# Patient Record
Sex: MALE | RURAL
[De-identification: names, ages, dates, MRNs, and addresses within clinical notes are randomized per-mention and may not be internally consistent; named-entity substitution may affect disease eponyms.]

---

## 2019-03-06 ENCOUNTER — HISTORICAL (OUTPATIENT)
Dept: ADMINISTRATIVE | Facility: HOSPITAL | Age: 54
End: 2019-03-06

## 2019-03-08 LAB
LAB AP CLINICAL INFORMATION: NORMAL
LAB AP COMMENTS: NORMAL
LAB AP DIAGNOSIS - HISTORICAL: NORMAL
LAB AP GROSS PATHOLOGY - HISTORICAL: NORMAL
LAB AP SPECIMEN SUBMITTED - HISTORICAL: NORMAL

## 2019-03-21 ENCOUNTER — HISTORICAL (OUTPATIENT)
Dept: ADMINISTRATIVE | Facility: HOSPITAL | Age: 54
End: 2019-03-21

## 2024-04-16 RX ORDER — EZETIMIBE AND SIMVASTATIN 10; 10 MG/1; MG/1
1 TABLET ORAL NIGHTLY
COMMUNITY

## 2024-04-16 RX ORDER — CARVEDILOL 12.5 MG/1
12.5 TABLET ORAL 2 TIMES DAILY WITH MEALS
COMMUNITY

## 2024-04-16 RX ORDER — PANTOPRAZOLE SODIUM 40 MG/1
40 TABLET, DELAYED RELEASE ORAL DAILY
COMMUNITY

## 2024-04-16 RX ORDER — ATORVASTATIN CALCIUM 80 MG/1
80 TABLET, FILM COATED ORAL DAILY
COMMUNITY

## 2024-04-16 RX ORDER — LOSARTAN POTASSIUM 50 MG/1
50 TABLET ORAL DAILY
COMMUNITY

## 2024-04-17 ENCOUNTER — HOSPITAL ENCOUNTER (OUTPATIENT)
Facility: HOSPITAL | Age: 59
Discharge: HOME | End: 2024-04-17
Attending: ANESTHESIOLOGY | Admitting: ANESTHESIOLOGY
Payer: COMMERCIAL

## 2024-04-17 VITALS
WEIGHT: 211.63 LBS | HEIGHT: 71 IN | HEART RATE: 51 BPM | RESPIRATION RATE: 11 BRPM | TEMPERATURE: 98 F | SYSTOLIC BLOOD PRESSURE: 109 MMHG | DIASTOLIC BLOOD PRESSURE: 72 MMHG | OXYGEN SATURATION: 100 % | BODY MASS INDEX: 29.63 KG/M2

## 2024-04-17 DIAGNOSIS — M25.552 LEFT HIP PAIN: ICD-10-CM

## 2024-04-17 PROCEDURE — 63600175 PHARM REV CODE 636 W HCPCS: Mod: JG | Performed by: ANESTHESIOLOGY

## 2024-04-17 PROCEDURE — 20610 DRAIN/INJ JOINT/BURSA W/O US: CPT | Mod: LT | Performed by: ANESTHESIOLOGY

## 2024-04-17 RX ORDER — TRIAMCINOLONE ACETONIDE 40 MG/ML
INJECTION, SUSPENSION INTRA-ARTICULAR; INTRAMUSCULAR CODE/TRAUMA/SEDATION MEDICATION
Status: DISCONTINUED | OUTPATIENT
Start: 2024-04-17 | End: 2024-04-17 | Stop reason: HOSPADM

## 2024-04-17 RX ORDER — SODIUM CHLORIDE 9 MG/ML
500 INJECTION, SOLUTION INTRAVENOUS CONTINUOUS
Status: DISCONTINUED | OUTPATIENT
Start: 2024-04-17 | End: 2024-04-17 | Stop reason: HOSPADM

## 2024-04-17 RX ORDER — BUPIVACAINE HYDROCHLORIDE 2.5 MG/ML
INJECTION, SOLUTION INFILTRATION; PERINEURAL CODE/TRAUMA/SEDATION MEDICATION
Status: DISCONTINUED | OUTPATIENT
Start: 2024-04-17 | End: 2024-04-17 | Stop reason: HOSPADM

## 2024-04-17 NOTE — H&P
[facility] - Pain Management  Pain Management  H&P    Patient Name: Felix Yi  MRN: [id number]  Admission Date: 2024  Primary Care Provider: Rabia Hope NP    Patient information was obtained from      Subjective:     Principal Problem:SWETHA LEE MD,P.A.  [address] [place] [id number]                 RE: Felix Yi   : 1965Date of Service: 3/1/2024  New Patient Referred by Rabia Hope        Chief Complaint:   Hip pain right, left; characterized as sharp pain Heat.   New patient seated in exam room 6 with c/o bilateral hip pain.  Controlled Substance Prescription Agreement signed and reviewed. Verbalizes understanding. 3-1-24  [place] Prescription Monitoring Program data was reviewed for this patient for the past 12 calendar months to ascertain any current,or past use of scheduled medications.                                                                                    Opioid Risk Tool                                                                                 Johnson each box                           Item Score                        Item Score                                                                              that applies.                               if Female.                          if Male                    1. Family history of substance abuse                  Alcohol  (  )                                      1                                     3                                                                              Illegal Drugs (  )                               2                                     3                                                                              Prescription Drugs (  )                     4                                     4        2. Personal History of substance abuse          Alcohol  (  )                                      3                                      3                                                                              Illegal Drugs (  )                               4                                     4                                                                             Prescription Drugs (  )                     5                                      5     3. Age (Johnson box if 16-45)                                           (  )                                          1                                      1        4. History of Preadolescent Sexual Abuse                   (  )                                         3                                      0        5. Psychological Disease    Attention Deficit disorder  (  )                                         2                                       2                                                             or                                              Obsessive Compulsive                                                           disorder                                                               or                                                                                       Bipolar Schizophrenia                                                              Depression                        (  )                                         1                                        1        Total=0     Total Score Risk Category           Low risk 0-3         Moderate risk 4-7              High risk >8           History of Present Illness:   What part of the body? bilateral hip   Pain level at best 3; Pain level at worst 10; Pain level at present 3   History of present illness  Felix Yi  is a  59y/o male who presents for evaluation and management of chronic pain in his lower back and bilateral hip pain.   Subjective: Patient presents today for evaluation for pain problems. Pt reports that pain started years ago. He reports that he has had Im injection of steroids, medrol dose pack, and some NSAIDS  by mouth that has not helped with his pain. He states when the pain occurs it is severe. He is unable to moveuch at all r/t the pain and has fallen because of the pain. We have discussed with the pt that he can do nothing and give time, he can try NSAIDS, he can do Hip IA injections x 3 with max of 3 in a year, he can do SI joint injections x2 to lead up to RFTC, He can do GTBs bilateral,  he can consider obturator/femoral articular NB,he can  and can consider SI fusion.  After review of records, pts physical exam and discussion with the patient we will plan to set him up for a left hip IA injection without sedation. We will also send him in a rx for Celebrex 200. Left hip IA injection without sedation DX:M25.552) - Left hip pain      A presumptive urine drug screen was done today to rapidly obtain and integrate results into clinical assessment and decision-making for ongoing safe prescribing of controlled substances   Allergies:   No Known Allergies Confirmed - 03/01/24 10:49:46 AM CST    Current Medications:   Medications List Reviewed (03/01/24 10:52:48 AM CST)  Atorvastatin Calcium Oral Tablet 80 MG (3/1/2024)  Carvedilol Oral Tablet 12.5 MG (3/1/2024)  Ezetimibe-Simvastatin Oral Tablet 10-10 MG (3/1/2024)  Pantoprazole Sodium Oral Packet 40 MG (3/1/2024)  Losartan Potassium Oral Tablet 50 MG (3/1/2024) Take 1 tablet once a day   Previous Studies:  X-RAY [facility] 01/24/2024-Bilateral Hip xray Impression Mild narrowing of both hip joints.   Past Medical History:   The patient has a past medical history of  Hypertension, High Cholesterol.   Surgical History:   Heart Stents   Social History:      Smoking Status: Never smoker; Last Reviewed: 03/01/2024   Tobacco User: No                     Alcohol use:  No drug use  Occupation: works full-time  Marital status: Single         Family History:   There is a family history of  Hypertension, Heart Disease.   Review of Systems:   General:  Patient denies  sweats,  fatigue, fever, chills, recent change in weight.  Eyes:  Patient denies  blurred vision, glaucoma.  Ears, Nose and Throat:  Patient denies  hearing loss, ringing in the ears, sinus congestion, difficulty swallowing.  Cardiovascular:  Patient denies  arrhythmia, chest pain, palpitations.  Respiratory:  Patient denies  requiring oxygen, shortness of breath, cough, wheezing.  Gastrointestinal:  Patient denies  ulcer, heartburn, nausea, vomiting, blood in stool, diarrhea, constipation, hemorrhoids.  Genitourinary:  Patient denies  hematuria, kidney stones, urinary frequency, polyuria, urinary hesitancy, dysuria, burning on urination, prostate problems, menstrual complications, painful intercourse.  Endocrine:  Patient denies  polydipsia, temperature intolerance, thyroid problems, loss of hair from head or body.  Hematologic:  Patient denies  bleeding tendencies, easy bruising tendency, bleeding disorders, bleeding gums.  Musculoskeletal:   Patient admits to   joint pain, joint stiffness, muscle cramps.  Patient denies  fractures, walking aids.  Neurologic  Patient denies  dizziness, seizures, headache, not confused or disoriented, memory lapses or loss, paralysis, difficulty walking.  Psychologic:  Patient denies  anxiety, panic attacks, mood disorder, emotional problems, depression, sleep disturbances, suicidal thoughts, suicide attempt(s).  Skin:  Patient denies  pruritus, jaundice, skin rash.    DEPRESSION SCREENING:   Not at all the patient reports little interest or pleasure in doing things.  Not at all the patient reports feeling down, depressed, or hopeless.  Date Depression Screening Last Done: 03/01/2024   PHQ-2 Score 0; PHQ-9 Score incomplete   Vital Signs:   Weight 216 lbs; Height 5 ft 11 in; BMI 30.1   03/01/2024 8:40 AM (CST)  Respiration Rate 18; Pulse Rate 59 bpm; Blood Pressure 132 / 79 mm/Hg; Pain Level: 3      Physical Examination:   Cranial Nerves II-XII grossly intact.  No apparent distress.  No  somnolence or slurred speech.     Lumbar spine  patient has pain with flexion and extension lateral rotation  Lumbar facets are tender to palpation  No focal neurologic deficits noted     Left hip  Pain with left hip with flexion and extension of the hip  Left SI joint  Left SI joint posteriorly rotated as compared to the right.                  Back Motion:   Lumbar / lumbosacral spine abnormal.         Additional Physical Findings:  General abnormal,   Awake, alert, oriented to time, place and person, well developed, pleasant, uncomfortable, seated  Musculoskeletal abnormal,   Hips abnormal, joint tenderness  Posture normal  Neurologic normal neurologic exam,   Cranial nerves, motor function  Gait and stance normal  Skin normal skin exam,   Dry, warm    Toxicology Report   Toxicology was performed.   Reason for Toxicology:  A presumptive urine drug screen was done today to rapidly obtain and integrate results into clinical assessment and decision-making for ongoing safe prescribing of controlled substances.   Test Date/Time: 03/01/2024 00:00   Tested By: GILMAR   Oxycodone  (OXY): Result = Negative; Control = Positive   Morphine  (OPI): Result = Negative; Control = Positive   Amphetamines  (AMP): Result = Negative; Control = Positive   Oxazepam  (BZO): Result = Negative; Control = Positive   Methadone  (MTD): Result = Negative; Control = Positive   Secobarbital  (BAR): Result = Negative; Control = Positive   Tricyclic Antidepressants  (TCA): Result = Negative; Control = Positive   Nortriptyline  (TCA): Result = Negative; Control = Positive   Marijuana-Carboxy Tetrahydrocannabinoid   (THC): Result = Positive; Control = Positive   Cocaine  (SAMARA): Result = Negative; Control = Positive   Ecstasy-Methylenedioxymethamphetamine  (MDMA): Result = Negative; Control = Positive   D Methamphetamine  (MET): Result = Negative; Control = Positive   Phencyclidine  (PCP): Result = Negative; Control = Positive   Adulterants  (OX, SG,  pH): Result = Negative; Control = Positive   Assessment:   (M25.551) - Bilateral hip pain  (M70.61) - Trochanteric bursitis of both hips  (M53.3) - Disorder of sacrum  (M54.50) - Low back pain  (M47.817) - Lumbosacral spondylosis without myelopathy  (M25.552) - Left hip pain   Plan:   Physical Activity Counseling   Nutrition Counseling         1. Follow up in 2 weeks after procedure.  Patient may contact office for earlier follow-up should an issue arise.     2. Left hip IA injection without sedation DX:M25.552) - Left hip pain     3. Procedure instructions given to pt who verbalized understanding. Procedure sheet discussed with the patient .  Verbally voicing understanding of the sheet concerning blood thinners, NPO status, and importance of a .     4. UDS     5. Contract signed.      6. Monitored Anesthesia Care medical necessity authorization request:       Monitor anesthesia request is medically indicated for the scheduled ___left hip IA_____procedure due to:     - needle phobia and anxiety, placing the patient at risk during the provided service._____  - patient has a BMI greater than 45 ____  - patient has severe sleep apnea for which BiPAP and oxygen are needed while sleeping._____  - patient is unable to follow simple commands due to mental state.____  - patient has an ASA class greater than 3 and requires constant presence of an anesthesiologist/CRNA during the procedure.____  - patient has severe problems with muscles and muscle spasticity that makes it hard to lie still. ____  - patient suffers from chronic pain and is unable to function due to diminished ADL's.____  - patient is dependent on opioids or sedatives.____  - Other ____     7. Celebrex 200mg      Compliance Statement:  Documented by Kaley Reid RN acting as a scribe for Jay Soler M.D. The note accurately reflects work and decisions performed by me.      Prescriptions Written Today:  CeleBREX Oral Capsule 200 MG  Take 1 capsule  once a day as needed for 30 day(s)  Refills: No Refills  Rx quantity: 30           Jay Soler MD   Electronically signed: 3/1/2024 1:31:58 PM          Chief Complaint:      HPI:       Assessment/Plan:         Jay Soler MD  Pain Management  [facility] Pain [facility]

## 2024-04-17 NOTE — DISCHARGE SUMMARY
Patient underwent Left Hip Intra-articular Injection under Fluoroscopic Guidance  procedure 04/17/2024. The pt will follow up in clinic. Discharged home. Discharge Dx:  Degenerative Joint Disease of the Hip

## 2024-04-17 NOTE — OP NOTE
04/17/2024  Felix Yi 03/03/1975    PREOPERATIVE DIAGNOSIS:     Degenerative Joint Disease of the Hip                                                         Left Hip Pain    POSTOPERATIVE DIAGNOSIS:  Degenerative Joint Disease of the Hip                                                                   Left Hip Pain     PROCEDURE:  Left Hip Intra-articular Injection under Fluoroscopic Guidance     COMPLICATIONS: None     ANESTHESIA: Local  DRAINS AND PACKS:  None  BLOOD LOSS:  None     The patient was identified in the holding area.  The risks and benefits of the procedure were again explained to the patient.  The patient agreed to proceed.  The patients hip was marked with a skin pen.  The patient was taken in stable condition to the operating room where the patient was placed in supine position on the C-Arm table.    All pressure points were checked and padded comfortably while the patient was awake.  Standard ASA monitors applied.  Time out was completed.   The patients hip was prepped and draped in the usual sterile fashion.  Using an approach in the anterior to medial approach in the anterior portion of the hip over the neck of the femur a 25 gauge 3½ inch needle was advanced into the intra articular space under fluoroscopic guidance on the  left side.  The patient then received Kenalog 40mg/ml 1ml and 0.25% Bupivacaine.(2.5mg/ml). The patient tolerated procedure well with no adverse events and no complications.      Preoperative pain score was    3/10.   Postoperative pain was     /10

## 2024-04-17 NOTE — DISCHARGE INSTRUCTIONS
REFER TO WRITTEN DOCUMENT AND RECOVERY INFORMATION.    D/CD PATIENT VIAA WHEELCHAIR AT 1010.    INFORMED PATIENT IF UNABLE TO VOID IN 8 HOURS, GO TO ER. NOTIFY MD OF REDNESS OR DRAINAGE FROM INJECTION SITE OR FEVER OVER 3-4 DAY. REST AND DRINK PLENTY OF FLUIDS FOR THE REMAINDER OF THE DAY. NO LIFTING OVER 5 LBS FOR THE REMAINDER OF THE DAY. CONTINUE REGULAR MEDICATIONS AS PRESCRIBED. MAY TAKE PAIN MEDICATION AS PRESCRIBED.     PAIN IMPROVED  !00%

## 2024-04-17 NOTE — PLAN OF CARE
Pt was no sedation.  Instructed no shower for 24 hours.  No tub bath for 48 hours.  Notify MD or go to ED for N/V, severe pain and any S/S of infection.

## (undated) DEVICE — GLOVE PROTEXIS PI SYN SURG 6.5

## (undated) DEVICE — APPLICATOR CHLORAPREP ORN 26ML

## (undated) DEVICE — TRAY NERVE BLOCK UNIV 10/CA

## (undated) DEVICE — TOWEL OR DISP STRL BLUE 4/PK

## (undated) DEVICE — NDL SPINAL CLR HUB 22GX4 3/4

## (undated) DEVICE — GLOVE PROTEXIS PI SYN SURG 7.5